# Patient Record
Sex: FEMALE | URBAN - METROPOLITAN AREA
[De-identification: names, ages, dates, MRNs, and addresses within clinical notes are randomized per-mention and may not be internally consistent; named-entity substitution may affect disease eponyms.]

---

## 2021-02-25 ENCOUNTER — TELEPHONE ENCOUNTER (OUTPATIENT)
Dept: URBAN - METROPOLITAN AREA CLINIC 113 | Facility: CLINIC | Age: 66
End: 2021-02-25

## 2022-12-07 ENCOUNTER — OFFICE VISIT (OUTPATIENT)
Dept: URBAN - METROPOLITAN AREA CLINIC 113 | Facility: CLINIC | Age: 67
End: 2022-12-07

## 2022-12-07 NOTE — HPI-TODAY'S VISIT:
66-year-old female is referred by Dr. Bryant Shelton for positive Cologuard test.  A copy of today's visit will be forwarded to the referring provider. Patient was also referred back in February 2021 however this was canceled as patient required hematology evaluation and hernia repair prior to colonoscopy. Labs 10/17/2022:Hemoglobin A1c 5.8, unremarkable lipid panel, alkaline phosphatase 158 otherwise normal hepatic function panel, normal CBC. Positive Cologuard test resulted on 11/24/2021.